# Patient Record
Sex: FEMALE | Race: WHITE | NOT HISPANIC OR LATINO
[De-identification: names, ages, dates, MRNs, and addresses within clinical notes are randomized per-mention and may not be internally consistent; named-entity substitution may affect disease eponyms.]

---

## 2017-01-05 ENCOUNTER — APPOINTMENT (OUTPATIENT)
Dept: PULMONOLOGY | Facility: CLINIC | Age: 57
End: 2017-01-05

## 2017-01-05 VITALS
OXYGEN SATURATION: 95 % | HEART RATE: 91 BPM | SYSTOLIC BLOOD PRESSURE: 130 MMHG | WEIGHT: 190 LBS | BODY MASS INDEX: 30.67 KG/M2 | RESPIRATION RATE: 16 BRPM | DIASTOLIC BLOOD PRESSURE: 94 MMHG

## 2017-01-05 DIAGNOSIS — Z78.9 OTHER SPECIFIED HEALTH STATUS: ICD-10-CM

## 2017-01-05 DIAGNOSIS — Z87.891 PERSONAL HISTORY OF NICOTINE DEPENDENCE: ICD-10-CM

## 2017-01-05 RX ORDER — LOSARTAN POTASSIUM 50 MG/1
50 TABLET, FILM COATED ORAL
Refills: 0 | Status: ACTIVE | COMMUNITY

## 2017-01-23 ENCOUNTER — RX RENEWAL (OUTPATIENT)
Age: 57
End: 2017-01-23

## 2017-03-08 ENCOUNTER — RX RENEWAL (OUTPATIENT)
Age: 57
End: 2017-03-08

## 2017-04-07 ENCOUNTER — APPOINTMENT (OUTPATIENT)
Dept: PULMONOLOGY | Facility: CLINIC | Age: 57
End: 2017-04-07

## 2017-04-28 ENCOUNTER — APPOINTMENT (OUTPATIENT)
Dept: PULMONOLOGY | Facility: CLINIC | Age: 57
End: 2017-04-28

## 2017-04-28 VITALS — HEIGHT: 66 IN | RESPIRATION RATE: 14 BRPM | HEART RATE: 97 BPM | OXYGEN SATURATION: 92 % | BODY MASS INDEX: 30.99 KG/M2

## 2017-04-28 VITALS — BODY MASS INDEX: 30.99 KG/M2 | WEIGHT: 192 LBS | DIASTOLIC BLOOD PRESSURE: 86 MMHG | SYSTOLIC BLOOD PRESSURE: 132 MMHG

## 2017-05-23 ENCOUNTER — MEDICATION RENEWAL (OUTPATIENT)
Age: 57
End: 2017-05-23

## 2017-08-16 ENCOUNTER — RX RENEWAL (OUTPATIENT)
Age: 57
End: 2017-08-16

## 2017-09-29 ENCOUNTER — APPOINTMENT (OUTPATIENT)
Dept: PULMONOLOGY | Facility: CLINIC | Age: 57
End: 2017-09-29
Payer: MEDICAID

## 2017-09-29 VITALS — BODY MASS INDEX: 30.51 KG/M2 | WEIGHT: 189 LBS | DIASTOLIC BLOOD PRESSURE: 84 MMHG | SYSTOLIC BLOOD PRESSURE: 130 MMHG

## 2017-09-29 VITALS — HEART RATE: 85 BPM | OXYGEN SATURATION: 95 %

## 2017-09-29 DIAGNOSIS — Z87.891 PERSONAL HISTORY OF NICOTINE DEPENDENCE: ICD-10-CM

## 2017-09-29 PROCEDURE — 99214 OFFICE O/P EST MOD 30 MIN: CPT

## 2017-10-05 ENCOUNTER — MEDICATION RENEWAL (OUTPATIENT)
Age: 57
End: 2017-10-05

## 2017-10-20 ENCOUNTER — RX RENEWAL (OUTPATIENT)
Age: 57
End: 2017-10-20

## 2017-11-03 ENCOUNTER — OTHER (OUTPATIENT)
Age: 57
End: 2017-11-03

## 2017-11-06 ENCOUNTER — RX RENEWAL (OUTPATIENT)
Age: 57
End: 2017-11-06

## 2017-11-29 ENCOUNTER — FORM ENCOUNTER (OUTPATIENT)
Age: 57
End: 2017-11-29

## 2017-11-30 ENCOUNTER — OUTPATIENT (OUTPATIENT)
Dept: OUTPATIENT SERVICES | Facility: HOSPITAL | Age: 57
LOS: 1 days | End: 2017-11-30
Payer: MEDICAID

## 2017-11-30 ENCOUNTER — APPOINTMENT (OUTPATIENT)
Dept: CT IMAGING | Facility: CLINIC | Age: 57
End: 2017-11-30
Payer: MEDICAID

## 2017-11-30 DIAGNOSIS — Z00.8 ENCOUNTER FOR OTHER GENERAL EXAMINATION: ICD-10-CM

## 2017-11-30 PROCEDURE — G0297: CPT

## 2017-11-30 PROCEDURE — G0297: CPT | Mod: 26

## 2017-12-18 ENCOUNTER — APPOINTMENT (OUTPATIENT)
Dept: PULMONOLOGY | Facility: CLINIC | Age: 57
End: 2017-12-18
Payer: MEDICAID

## 2017-12-18 VITALS — SYSTOLIC BLOOD PRESSURE: 124 MMHG | DIASTOLIC BLOOD PRESSURE: 84 MMHG | HEART RATE: 83 BPM | OXYGEN SATURATION: 93 %

## 2017-12-18 VITALS — BODY MASS INDEX: 31.64 KG/M2 | WEIGHT: 196 LBS

## 2017-12-18 PROCEDURE — 94010 BREATHING CAPACITY TEST: CPT

## 2017-12-18 PROCEDURE — 99215 OFFICE O/P EST HI 40 MIN: CPT | Mod: 25

## 2018-01-10 ENCOUNTER — RX RENEWAL (OUTPATIENT)
Age: 58
End: 2018-01-10

## 2018-03-19 ENCOUNTER — APPOINTMENT (OUTPATIENT)
Dept: PULMONOLOGY | Facility: CLINIC | Age: 58
End: 2018-03-19

## 2018-04-02 ENCOUNTER — MEDICATION RENEWAL (OUTPATIENT)
Age: 58
End: 2018-04-02

## 2018-04-10 ENCOUNTER — APPOINTMENT (OUTPATIENT)
Dept: PULMONOLOGY | Facility: CLINIC | Age: 58
End: 2018-04-10
Payer: MEDICAID

## 2018-04-10 VITALS
SYSTOLIC BLOOD PRESSURE: 112 MMHG | BODY MASS INDEX: 30.53 KG/M2 | WEIGHT: 190 LBS | RESPIRATION RATE: 14 BRPM | HEART RATE: 79 BPM | HEIGHT: 66 IN | DIASTOLIC BLOOD PRESSURE: 64 MMHG | OXYGEN SATURATION: 93 %

## 2018-04-10 PROCEDURE — 99214 OFFICE O/P EST MOD 30 MIN: CPT

## 2018-04-27 ENCOUNTER — RX RENEWAL (OUTPATIENT)
Age: 58
End: 2018-04-27

## 2018-05-07 ENCOUNTER — RX RENEWAL (OUTPATIENT)
Age: 58
End: 2018-05-07

## 2018-06-04 ENCOUNTER — MEDICATION RENEWAL (OUTPATIENT)
Age: 58
End: 2018-06-04

## 2018-07-31 ENCOUNTER — MEDICATION RENEWAL (OUTPATIENT)
Age: 58
End: 2018-07-31

## 2018-07-31 ENCOUNTER — RX RENEWAL (OUTPATIENT)
Age: 58
End: 2018-07-31

## 2018-08-29 ENCOUNTER — APPOINTMENT (OUTPATIENT)
Dept: PULMONOLOGY | Facility: CLINIC | Age: 58
End: 2018-08-29
Payer: MEDICAID

## 2018-08-29 VITALS
DIASTOLIC BLOOD PRESSURE: 78 MMHG | OXYGEN SATURATION: 91 % | HEART RATE: 74 BPM | HEIGHT: 66 IN | SYSTOLIC BLOOD PRESSURE: 130 MMHG | BODY MASS INDEX: 31.8 KG/M2

## 2018-08-29 VITALS — WEIGHT: 197 LBS | BODY MASS INDEX: 31.8 KG/M2

## 2018-08-29 DIAGNOSIS — Z87.891 PERSONAL HISTORY OF NICOTINE DEPENDENCE: ICD-10-CM

## 2018-08-29 PROCEDURE — 94060 EVALUATION OF WHEEZING: CPT

## 2018-08-29 PROCEDURE — 94729 DIFFUSING CAPACITY: CPT

## 2018-08-29 PROCEDURE — 94727 GAS DIL/WSHOT DETER LNG VOL: CPT

## 2018-08-29 PROCEDURE — 94664 DEMO&/EVAL PT USE INHALER: CPT | Mod: 59

## 2018-08-29 PROCEDURE — 99214 OFFICE O/P EST MOD 30 MIN: CPT | Mod: 25

## 2018-08-29 PROCEDURE — 85018 HEMOGLOBIN: CPT | Mod: QW

## 2018-10-02 ENCOUNTER — MEDICATION RENEWAL (OUTPATIENT)
Age: 58
End: 2018-10-02

## 2018-10-15 ENCOUNTER — RX RENEWAL (OUTPATIENT)
Age: 58
End: 2018-10-15

## 2018-10-29 ENCOUNTER — RX RENEWAL (OUTPATIENT)
Age: 58
End: 2018-10-29

## 2018-12-10 ENCOUNTER — APPOINTMENT (OUTPATIENT)
Dept: PULMONOLOGY | Facility: CLINIC | Age: 58
End: 2018-12-10
Payer: MEDICARE

## 2018-12-13 ENCOUNTER — FORM ENCOUNTER (OUTPATIENT)
Age: 58
End: 2018-12-13

## 2018-12-14 ENCOUNTER — OUTPATIENT (OUTPATIENT)
Dept: OUTPATIENT SERVICES | Facility: HOSPITAL | Age: 58
LOS: 1 days | End: 2018-12-14
Payer: MEDICARE

## 2018-12-14 ENCOUNTER — APPOINTMENT (OUTPATIENT)
Dept: CT IMAGING | Facility: CLINIC | Age: 58
End: 2018-12-14
Payer: MEDICARE

## 2018-12-14 DIAGNOSIS — Z87.891 PERSONAL HISTORY OF NICOTINE DEPENDENCE: ICD-10-CM

## 2018-12-14 PROCEDURE — G0297: CPT | Mod: 26

## 2018-12-14 PROCEDURE — G0297: CPT

## 2019-01-08 ENCOUNTER — APPOINTMENT (OUTPATIENT)
Dept: PULMONOLOGY | Facility: CLINIC | Age: 59
End: 2019-01-08
Payer: MEDICARE

## 2019-01-08 VITALS
OXYGEN SATURATION: 93 % | BODY MASS INDEX: 31.47 KG/M2 | SYSTOLIC BLOOD PRESSURE: 132 MMHG | HEART RATE: 90 BPM | RESPIRATION RATE: 16 BRPM | WEIGHT: 195 LBS | DIASTOLIC BLOOD PRESSURE: 80 MMHG

## 2019-01-08 PROCEDURE — 99214 OFFICE O/P EST MOD 30 MIN: CPT

## 2019-01-08 RX ORDER — PREDNISONE 10 MG/1
10 TABLET ORAL
Qty: 50 | Refills: 0 | Status: DISCONTINUED | COMMUNITY
Start: 2017-12-18 | End: 2019-01-08

## 2019-01-08 NOTE — REASON FOR VISIT
[Follow-Up] : a follow-up visit [COPD] : COPD [Shortness of Breath] : shortness of Breath [Sleep Apnea] : sleep apnea [FreeTextEntry2] : nicotine dependence, weight issues

## 2019-01-08 NOTE — REVIEW OF SYSTEMS
[Postnasal Drip] : postnasal drip [Reflux] : reflux [Depression] : depression [Thyroid Problem] : thyroid problem [As Noted in HPI] : as noted in HPI [Fever] : no fever [Chills] : no chills [Dry Eyes] : no dryness of the eyes [Eye Irritation] : no ~T irritation of the eyes [Nasal Congestion] : no nasal congestion [Epistaxis] : no nosebleeds [Sinus Problems] : no sinus problems [Cough] : no cough [Sputum] : not coughing up ~M sputum [Hemoptysis] : no hemoptysis [Dyspnea] : no dyspnea [Chest Tightness] : no chest tightness [Pleuritic Pain] : no pleuritic pain [Wheezing] : no wheezing [Hypertension] : no ~T hypertension [Chest Discomfort] : no chest discomfort [Dysrhythmia] : no dysrhythmia [Murmurs] : no murmurs were heard [Palpitations] : no palpitations [Edema] : ~T edema was not present [Hay Fever] : no hay fever [Itchy Eyes] : no itching of ~T the eyes [Nausea] : no nausea [Vomiting] : no vomiting [Constipation] : no constipation [Diarrhea] : no diarrhea [Abdominal Pain] : no abdominal pain [Dysuria] : no dysuria [Trauma] : no ~T physical trauma [Fracture] : no fracture [Anemia] : no anemia [Anxiety] : no anxiety [Diabetes] : no diabetes mellitus

## 2019-01-08 NOTE — PROCEDURE
[FreeTextEntry1] : PFTs performed previously revealed a mildly reduced FVC and moderately reduced FEV1 with an obstructive pattern. Lung volumes were elevated, c/w hyperinflation and gas trapping. Diffusion capacity was moderately reduced but improved though did not totally correct for alveolar volume. Overall, her spirometry was slightly improved but lung volumes were increased and diffusion capacity was near baseline.\par Spirometry 12/18/17 - moderate COPD and appears to be near baseline.\par PFTs 8/29/18 - mild reduction in FVC and moderate to severe reduction in FEV1 with an obstructive pattern and a significant BD response; Lung volumes are near normal; diffusion capacity is moderately reduced and improves when corrected for alveolar volume; slightly reduced lung function c/w her previous

## 2019-01-08 NOTE — PHYSICAL EXAM
[General Appearance - Well Developed] : well developed [Normal Appearance] : normal appearance [General Appearance - In No Acute Distress] : no acute distress [Normal Conjunctiva] : the conjunctiva exhibited no abnormalities [Low Lying Soft Palate] : low lying soft palate [Enlarged Base of the Tongue] : enlargement of the base of the tongue [III] : III [Neck Appearance] : the appearance of the neck was normal [Heart Rate And Rhythm] : heart rate and rhythm were normal [Heart Sounds] : normal S1 and S2 [Murmurs] : no murmurs present [Edema] : no peripheral edema present [] : no respiratory distress [Respiration, Rhythm And Depth] : normal respiratory rhythm and effort [Exaggerated Use Of Accessory Muscles For Inspiration] : no accessory muscle use [Scattered Wheezes] : scattered wheezing [Abdomen Soft] : soft [Abdomen Tenderness] : non-tender [Abnormal Walk] : normal gait [Nail Clubbing] : no clubbing of the fingernails [Cyanosis, Localized] : no localized cyanosis [No Focal Deficits] : no focal deficits [Oriented To Time, Place, And Person] : oriented to person, place, and time [FreeTextEntry1] : No abnormalities

## 2019-01-08 NOTE — HISTORY OF PRESENT ILLNESS
[Doing Well] : doing well [Difficulty Breathing During Exertion] : stable dyspnea on exertion [Feelings Of Weakness On Exertion] : stable exercise intolerance [Cough] : denies coughing [Coughing Up Sputum] : denies coughing up sputum [Wheezing] : denies wheezing [Regional Soft Tissue Swelling Both Lower Extremities] : denies lower extremity edema [Adherent] : the patient is adherent with ~his/her~ medication regimen [Goals--Doing Well] : the patient is doing well with ~his/her~ COPD goals [Excessive Daytime Sleepiness] : excessive daytime sleepiness [Snoring] : snoring [Unrefreshing Sleep] : unrefreshing sleep [Sleepy When Sedentary] : sleepy when sedentary [Morning Headaches] : morning headaches [None] : The patient is not currently being treated for this problem [Follow-Up - Routine Clinic] : clinic follow-up for [High Interest] : high interest in quitting [Several Times] : tried to quit several times [Weight Control] : weight control [Stress Management] : stress management [Saving Money] : saving money [Improved Health] : improved health [Smoking Addiction] : smoking addiction [Nicotine Craving] : nicotine craving [Currently Experiencing] : The patient is currently experiencing symptoms. [FreeTextEntry1] : The patient reports that she is recovering from a URI/bronchitis but continues to c/o mild cough and SOBOE. She reports completing a course of Augmentin for her bronchitis. [Witnessed Apnea During Sleep] : no witnessed apnea during sleep [Witnessed Gasping During Sleep] : no witnessed gasping during sleep

## 2019-01-08 NOTE — DISCUSSION/SUMMARY
[FreeTextEntry1] : \par #1. She no longer has her BiPAP at 19/14 cm of water to treat severe YONY as she was noncompliant with it but was using an oral appliance; will repeat PSG\par #2. Continue Spiriva and Symbicort for COPD with Albuterol nebulizer and Ventolin as needed; ordered nebulizer for Albuterol nebs as needed for COPD previously\par #3. Continue smoking cessation - stressed importance; pt quit 7/2018\par #4. F/u after PSG\par #5. Diet and exercise for weight loss\par #6. Ambien for poor sleep as needed\par #7. Chest CT for lung cancer screening revealed resolution of very small nodules measuring up to 2 mm but otherwise stable changes- rec annual f/u (1/2020).

## 2019-01-08 NOTE — CONSULT LETTER
[Dear  ___] : Dear  [unfilled], [Consult Letter:] : I had the pleasure of evaluating your patient, [unfilled]. [Please see my note below.] : Please see my note below. [Consult Closing:] : Thank you very much for allowing me to participate in the care of this patient.  If you have any questions, please do not hesitate to contact me. [Sincerely,] : Sincerely, [Jostin Mendieta MD] : Jostin Mendieta MD [FreeTextEntry3] : Jostin Mendieta MD, FCCP, ROXANNE. ABSM\par

## 2019-02-22 ENCOUNTER — APPOINTMENT (OUTPATIENT)
Dept: PULMONOLOGY | Facility: CLINIC | Age: 59
End: 2019-02-22

## 2019-04-08 ENCOUNTER — RX RENEWAL (OUTPATIENT)
Age: 59
End: 2019-04-08

## 2019-04-16 ENCOUNTER — RX RENEWAL (OUTPATIENT)
Age: 59
End: 2019-04-16

## 2019-05-09 ENCOUNTER — RX RENEWAL (OUTPATIENT)
Age: 59
End: 2019-05-09

## 2019-06-14 ENCOUNTER — APPOINTMENT (OUTPATIENT)
Dept: PULMONOLOGY | Facility: CLINIC | Age: 59
End: 2019-06-14
Payer: MEDICARE

## 2019-06-14 VITALS — WEIGHT: 203 LBS | DIASTOLIC BLOOD PRESSURE: 92 MMHG | SYSTOLIC BLOOD PRESSURE: 146 MMHG | BODY MASS INDEX: 32.77 KG/M2

## 2019-06-14 VITALS — OXYGEN SATURATION: 94 % | HEART RATE: 86 BPM

## 2019-06-14 PROCEDURE — 94010 BREATHING CAPACITY TEST: CPT

## 2019-06-14 PROCEDURE — G0296 VISIT TO DETERM LDCT ELIG: CPT

## 2019-06-14 PROCEDURE — 99215 OFFICE O/P EST HI 40 MIN: CPT | Mod: 25

## 2019-06-14 RX ORDER — ALBUTEROL SULFATE 90 UG/1
108 (90 BASE) AEROSOL, METERED RESPIRATORY (INHALATION)
Qty: 1 | Refills: 5 | Status: DISCONTINUED | COMMUNITY
Start: 2019-03-07 | End: 2019-06-14

## 2019-06-14 NOTE — REVIEW OF SYSTEMS
[Postnasal Drip] : postnasal drip [Reflux] : reflux [Depression] : depression [Thyroid Problem] : thyroid problem [As Noted in HPI] : as noted in HPI [Fever] : no fever [Chills] : no chills [Dry Eyes] : no dryness of the eyes [Eye Irritation] : no ~T irritation of the eyes [Epistaxis] : no nosebleeds [Nasal Congestion] : no nasal congestion [Sputum] : not coughing up ~M sputum [Cough] : no cough [Sinus Problems] : no sinus problems [Hemoptysis] : no hemoptysis [Dyspnea] : no dyspnea [Pleuritic Pain] : no pleuritic pain [Chest Tightness] : no chest tightness [Wheezing] : no wheezing [Hypertension] : no ~T hypertension [Chest Discomfort] : no chest discomfort [Murmurs] : no murmurs were heard [Dysrhythmia] : no dysrhythmia [Edema] : ~T edema was not present [Palpitations] : no palpitations [Hay Fever] : no hay fever [Itchy Eyes] : no itching of ~T the eyes [Nausea] : no nausea [Constipation] : no constipation [Vomiting] : no vomiting [Diarrhea] : no diarrhea [Dysuria] : no dysuria [Abdominal Pain] : no abdominal pain [Trauma] : no ~T physical trauma [Anemia] : no anemia [Fracture] : no fracture [Anxiety] : no anxiety [Diabetes] : no diabetes mellitus

## 2019-06-14 NOTE — DISCUSSION/SUMMARY
[FreeTextEntry1] : \par #1. She no longer has her BiPAP at 19/14 cm of water to treat severe YONY as she was noncompliant with it but was using an oral appliance which she also stopped; will repeat PSG but she wants a HST\par #2. Continue Spiriva and Symbicort for COPD with Albuterol nebulizer and Ventolin as needed; ordered nebulizer for Albuterol nebs as needed for COPD previously\par #3. Continue smoking cessation - stressed importance; pt quit 7/2018 after 1 ppd x 40 yrs\par #4. F/u after home PSG\par #5. Diet and exercise for weight loss\par #6. Ambien for poor sleep as needed\par #7. Chest CT for lung cancer screening given smoking hx. Risks and benefits regarding screening CT discussed including but not limited to the benefit of early lung cancer detection as well as other possible unknown pathology but also risk of discovering nodules or other findings which may be benign but will require periodic evaluation. Her last chest CT for lung cancer screening revealed resolution of very small nodules measuring up to 2 mm but otherwise stable changes- rec annual f/u (12/2019).

## 2019-06-14 NOTE — HISTORY OF PRESENT ILLNESS
[Doing Well] : doing well [Feelings Of Weakness On Exertion] : stable exercise intolerance [Difficulty Breathing During Exertion] : stable dyspnea on exertion [Cough] : denies coughing [Coughing Up Sputum] : denies coughing up sputum [Wheezing] : denies wheezing [Regional Soft Tissue Swelling Both Lower Extremities] : denies lower extremity edema [Adherent] : the patient is adherent with ~his/her~ medication regimen [Goals--Doing Well] : the patient is doing well with ~his/her~ COPD goals [Excessive Daytime Sleepiness] : excessive daytime sleepiness [Snoring] : snoring [Unrefreshing Sleep] : unrefreshing sleep [Sleepy When Sedentary] : sleepy when sedentary [Morning Headaches] : morning headaches [None] : The patient is not currently being treated for this problem [Follow-Up - Routine Clinic] : clinic follow-up for [High Interest] : high interest in quitting [Several Times] : tried to quit several times [Weight Control] : weight control [Stress Management] : stress management [Saving Money] : saving money [Improved Health] : improved health [Nicotine Craving] : nicotine craving [Smoking Addiction] : smoking addiction [Currently Experiencing] : The patient is currently experiencing symptoms. [FreeTextEntry1] : The patient reports that she is recovering from a URI/bronchitis but continues to c/o mild cough and SOBOE. She reports completing a course of Augmentin for her bronchitis. [Witnessed Apnea During Sleep] : no witnessed apnea during sleep [Witnessed Gasping During Sleep] : no witnessed gasping during sleep

## 2019-06-14 NOTE — PHYSICAL EXAM
[General Appearance - Well Developed] : well developed [Normal Appearance] : normal appearance [General Appearance - In No Acute Distress] : no acute distress [Normal Conjunctiva] : the conjunctiva exhibited no abnormalities [Low Lying Soft Palate] : low lying soft palate [III] : III [Enlarged Base of the Tongue] : enlargement of the base of the tongue [Neck Appearance] : the appearance of the neck was normal [Heart Rate And Rhythm] : heart rate and rhythm were normal [Heart Sounds] : normal S1 and S2 [Murmurs] : no murmurs present [Edema] : no peripheral edema present [Respiration, Rhythm And Depth] : normal respiratory rhythm and effort [] : no respiratory distress [Exaggerated Use Of Accessory Muscles For Inspiration] : no accessory muscle use [Scattered Wheezes] : scattered wheezing [Abdomen Soft] : soft [Abdomen Tenderness] : non-tender [Abnormal Walk] : normal gait [Nail Clubbing] : no clubbing of the fingernails [Cyanosis, Localized] : no localized cyanosis [No Focal Deficits] : no focal deficits [Oriented To Time, Place, And Person] : oriented to person, place, and time [FreeTextEntry1] : Moderate oropharyngeal crowding.

## 2019-06-14 NOTE — PROCEDURE
[FreeTextEntry1] : PFTs performed previously revealed a mildly reduced FVC and moderately reduced FEV1 with an obstructive pattern. Lung volumes were elevated, c/w hyperinflation and gas trapping. Diffusion capacity was moderately reduced but improved though did not totally correct for alveolar volume. Overall, her spirometry was slightly improved but lung volumes were increased and diffusion capacity was near baseline.\par Spirometry 12/18/17 - moderate COPD and appears to be near baseline.\par PFTs 8/29/18 - mild reduction in FVC and moderate to severe reduction in FEV1 with an obstructive pattern and a significant BD response; Lung volumes are near normal; diffusion capacity is moderately reduced and improves when corrected for alveolar volume; slightly reduced lung function c/w her previous\par Spirometry 6/14/19 - Normal FVC and moderately reduced FEV1 with an obstructive pattern and improved from previous

## 2019-06-14 NOTE — CONSULT LETTER
[Consult Letter:] : I had the pleasure of evaluating your patient, [unfilled]. [Dear  ___] : Dear  [unfilled], [Please see my note below.] : Please see my note below. [Consult Closing:] : Thank you very much for allowing me to participate in the care of this patient.  If you have any questions, please do not hesitate to contact me. [Sincerely,] : Sincerely, [Jostin Mendieta MD] : Jostin Mendieta MD [FreeTextEntry3] : Jostin Mendieta MD, FCCP, ROXANNE. ABSM\par

## 2019-11-12 ENCOUNTER — RX RENEWAL (OUTPATIENT)
Age: 59
End: 2019-11-12

## 2019-11-12 RX ORDER — ALBUTEROL SULFATE 90 UG/1
108 (90 BASE) AEROSOL, METERED RESPIRATORY (INHALATION)
Qty: 1 | Refills: 5 | Status: DISCONTINUED | COMMUNITY
Start: 2019-06-14 | End: 2019-11-12

## 2019-12-29 ENCOUNTER — FORM ENCOUNTER (OUTPATIENT)
Age: 59
End: 2019-12-29

## 2019-12-30 ENCOUNTER — APPOINTMENT (OUTPATIENT)
Dept: CT IMAGING | Facility: CLINIC | Age: 59
End: 2019-12-30
Payer: COMMERCIAL

## 2019-12-30 ENCOUNTER — OUTPATIENT (OUTPATIENT)
Dept: OUTPATIENT SERVICES | Facility: HOSPITAL | Age: 59
LOS: 1 days | End: 2019-12-30
Payer: MEDICARE

## 2019-12-30 VITALS — HEIGHT: 66 IN | WEIGHT: 195 LBS | BODY MASS INDEX: 31.34 KG/M2

## 2019-12-30 DIAGNOSIS — E66.3 OVERWEIGHT: ICD-10-CM

## 2019-12-30 PROCEDURE — G0297: CPT

## 2019-12-30 PROCEDURE — G0297: CPT | Mod: 26

## 2019-12-30 NOTE — DATA REVIEWED
[Lung Cancer Screening] : Patient underwent lung cancer screening [S] : S [2] : 2 [1] : 1 [TextBox_12] : 10/15 [TextBox_27] : 11/16 [TextBox_42] : 12/18 [TextBox_52] : 4

## 2019-12-30 NOTE — HISTORY OF PRESENT ILLNESS
[TextBox_13] : Referred by Dr. Jostin Mendieta.\par \par Ms. HERNANDEZ is a 59 year old female with a history of HTN, COPD and emphysema.\par \par She was called today to review eligibility for Low-Dose CT lung cancer screening.  Reviewed and confirmed that the patient meets screening eligibility criteria:\par \par 59 years old \par \par Smoking Status: Current Smoker\par \par Number of pack(s) per day: 1\par Number of years smoked: 40\par Number of pack years smokin\par \par Ms. HERNANDEZ denies any symptoms of lung cancer, including new cough, change in cough, hemoptysis, and unintentional weight loss.\par \par Ms. HERNANDEZ denies any personal history of lung cancer.  No lung cancer in a first degree relative.  Denies any history of occupational exposures.

## 2019-12-30 NOTE — PLAN
[FreeTextEntry1] : - Low Dose CT chest for lung cancer screening.\par \par - Encouraged smoking cessation\par \par \par

## 2019-12-30 NOTE — REASON FOR VISIT
[Annual Follow-Up] : an annual follow-up visit [Virtual Visit] : virtual visit [Low-Dose CT Screening Discussion] : low-dose CT lung cancer screening discussion

## 2020-02-10 ENCOUNTER — RX RENEWAL (OUTPATIENT)
Age: 60
End: 2020-02-10

## 2020-03-03 ENCOUNTER — RX CHANGE (OUTPATIENT)
Age: 60
End: 2020-03-03

## 2020-03-04 ENCOUNTER — APPOINTMENT (OUTPATIENT)
Dept: PULMONOLOGY | Facility: CLINIC | Age: 60
End: 2020-03-04
Payer: COMMERCIAL

## 2020-03-04 VITALS
DIASTOLIC BLOOD PRESSURE: 80 MMHG | OXYGEN SATURATION: 94 % | HEIGHT: 66 IN | SYSTOLIC BLOOD PRESSURE: 140 MMHG | HEART RATE: 76 BPM | BODY MASS INDEX: 31.82 KG/M2 | WEIGHT: 198 LBS

## 2020-03-04 PROCEDURE — 99214 OFFICE O/P EST MOD 30 MIN: CPT

## 2020-03-04 PROCEDURE — G0296 VISIT TO DETERM LDCT ELIG: CPT

## 2020-03-04 NOTE — COUNSELING
[Potential consequences of obesity discussed] : Potential consequences of obesity discussed [Benefits of weight loss discussed] : Benefits of weight loss discussed [Encouraged to increase physical activity] : Encouraged to increase physical activity [ - Annual Lung Cancer Screening/Share Decision Making Discussion] : Annual Lung Cancer Screening/Share Decision Making Discussion. (I have advised this patient to have a Low Dose CT (LDCT) scan of the lungs and have discussed the following with the patient in a shared decision making discussion:   Benefits of Detection and Early Treatment: There is adequate evidence that annual screening for lung cancer with LDCT in a population of high-risk persons can prevent a substantial number of lung cancer–related deaths. The magnitude of benefit depends on the individual patient's risk for lung cancer, as those who are at highest risk are most likely to benefit. Screening cannot prevent most lung cancer–related deaths, and does not replace smoking cessation. Harms of Detection and Early Intervention and Treatment: The harms associated with LDCT screening include false-negative and false-positive results, incidental findings, over diagnosis, and radiation exposure. False-positive LDCT results occur in a substantial proportion of screened persons; 95% of all positive results do not lead to a diagnosis of cancer. In a high-quality screening program, further imaging can resolve most false-positive results; however, some patients may require invasive procedures. Radiation harms, including cancer resulting from cumulative exposure to radiation, vary depending on the age at the start of screening; the number of scans received; and the person's exposure to other sources of radiation, particularly other medical imaging.)

## 2020-03-04 NOTE — CONSULT LETTER
[Dear  ___] : Dear  [unfilled], [Please see my note below.] : Please see my note below. [Consult Letter:] : I had the pleasure of evaluating your patient, [unfilled]. [Consult Closing:] : Thank you very much for allowing me to participate in the care of this patient.  If you have any questions, please do not hesitate to contact me. [Sincerely,] : Sincerely, [Jostin Mendieta MD] : Jostin Mendieta MD [FreeTextEntry3] : Jostin Mendieta MD, FCCP, D. ABSM\par Pulmonary and Sleep Medicine\par [facility] [place]

## 2020-03-04 NOTE — HISTORY OF PRESENT ILLNESS
[Doing Well] : doing well [Difficulty Breathing During Exertion] : stable dyspnea on exertion [Feelings Of Weakness On Exertion] : stable exercise intolerance [Cough] : denies coughing [Coughing Up Sputum] : denies coughing up sputum [Wheezing] : denies wheezing [Regional Soft Tissue Swelling Both Lower Extremities] : denies lower extremity edema [Adherent] : the patient is adherent with ~his/her~ medication regimen [Goals--Doing Well] : the patient is doing well with ~his/her~ COPD goals [Excessive Daytime Sleepiness] : excessive daytime sleepiness [Snoring] : snoring [Unrefreshing Sleep] : unrefreshing sleep [Sleepy When Sedentary] : sleepy when sedentary [Morning Headaches] : morning headaches [None] : The patient is not currently being treated for this problem [Follow-Up - Routine Clinic] : clinic follow-up for [High Interest] : high interest in quitting [Several Times] : tried to quit several times [Weight Control] : weight control [Stress Management] : stress management [Saving Money] : saving money [Improved Health] : improved health [Smoking Addiction] : smoking addiction [Nicotine Craving] : nicotine craving [Currently Experiencing] : The patient is currently experiencing symptoms. [FreeTextEntry1] : The patient reports that she is recovering from a URI/bronchitis but continues to c/o mild cough and SOBOE. She reports completing a course of Augmentin for her bronchitis.\par She is an ex-smoker of 1 ppd x 40 years but quit 7/2018. [Witnessed Apnea During Sleep] : no witnessed apnea during sleep [Witnessed Gasping During Sleep] : no witnessed gasping during sleep

## 2020-03-04 NOTE — DISCUSSION/SUMMARY
[FreeTextEntry1] : \par #1. She no longer has her BiPAP at 19/14 cm of water to treat severe YONY as she was noncompliant with it but was using an oral appliance which she also stopped; will repeat PSG but she wants a HST\par #2. Continue Spiriva and Symbicort for COPD with Albuterol nebulizer and Ventolin as needed; ordered nebulizer for Albuterol nebs as needed for COPD previously\par #3. Continue smoking cessation - stressed importance; pt quit 7/2018 after 1 ppd x 40 yrs; minimize E-cigarette use\par #4. F/u after home PSG\par #5. Diet and exercise for weight loss\par #6. Ambien for poor sleep as needed\par #7. Chest CT for lung cancer screening given smoking hx. Risks and benefits regarding screening CT discussed including but not limited to the benefit of early lung cancer detection as well as other possible unknown pathology but also risk of discovering nodules or other findings which may be benign but will require periodic evaluation. Her last chest CT for lung cancer screening revealed resolution of very small nodules measuring up to 2 mm but otherwise stable changes- rec annual f/u (12/2020-1/2021). Current CT is stable

## 2020-03-04 NOTE — REVIEW OF SYSTEMS
[Postnasal Drip] : postnasal drip [Reflux] : reflux [Depression] : depression [As Noted in HPI] : as noted in HPI [Thyroid Problem] : thyroid problem [Fever] : no fever [Chills] : no chills [Dry Eyes] : no dryness of the eyes [Eye Irritation] : no ~T irritation of the eyes [Nasal Congestion] : no nasal congestion [Epistaxis] : no nosebleeds [Sinus Problems] : no sinus problems [Cough] : no cough [Sputum] : not coughing up ~M sputum [Hemoptysis] : no hemoptysis [Dyspnea] : no dyspnea [Chest Tightness] : no chest tightness [Pleuritic Pain] : no pleuritic pain [Wheezing] : no wheezing [Hypertension] : no ~T hypertension [Chest Discomfort] : no chest discomfort [Dysrhythmia] : no dysrhythmia [Murmurs] : no murmurs were heard [Palpitations] : no palpitations [Edema] : ~T edema was not present [Hay Fever] : no hay fever [Itchy Eyes] : no itching of ~T the eyes [Nausea] : no nausea [Vomiting] : no vomiting [Constipation] : no constipation [Diarrhea] : no diarrhea [Abdominal Pain] : no abdominal pain [Dysuria] : no dysuria [Trauma] : no ~T physical trauma [Fracture] : no fracture [Anemia] : no anemia [Anxiety] : no anxiety [Diabetes] : no diabetes mellitus

## 2020-06-02 ENCOUNTER — APPOINTMENT (OUTPATIENT)
Dept: NEUROSURGERY | Facility: CLINIC | Age: 60
End: 2020-06-02
Payer: COMMERCIAL

## 2020-06-02 VITALS
WEIGHT: 195 LBS | HEIGHT: 66 IN | SYSTOLIC BLOOD PRESSURE: 177 MMHG | HEART RATE: 102 BPM | DIASTOLIC BLOOD PRESSURE: 92 MMHG | BODY MASS INDEX: 31.34 KG/M2

## 2020-06-02 PROCEDURE — 99204 OFFICE O/P NEW MOD 45 MIN: CPT

## 2020-06-02 NOTE — PLAN
[FreeTextEntry1] : \par DIAGNOSIS:  DISK DEGENERATION  /STENOSIS \par TREATMENT PLAN:  NON-SURGICAL\par \par This is a patient with a degenerated lumbar disk.  I have recommended nonsurgical management at this time.  This consists of physical therapy and/or manual medicine in conjunction to medical therapy and other conservative methods.  These include the consideration of trigger point injections and or the utilization of modalities such as TENS where applicable.  The next tier would be the referral to a pain management specialist (anesthesia or physiatry) for the consideration of spinal injections.  This includes the options of epidural steroids, facet injections as well as other novel techniques that may provide pain relief.  Although there is indeed evidence of disk degeneration on imaging, discectomy or spinal fusion for the sole purposes of treating back/leg pain in the absence of a compressive lesion or neurological issue is associated with poor outcomes.   \par \par I have reviewed the images in detail with the patient today in my office and have answered all questions regarding this condition to the best of my ability to the patient’s satisfaction.

## 2020-06-02 NOTE — PHYSICAL EXAM
[Antalgic] : antalgic [Intact] : all sensory within normal limits bilaterally [Able to toe walk] : the patient was not able to toe walk [Able to heel walk] : the patient was not able to heel walk

## 2020-06-02 NOTE — RESULTS/DATA
[FreeTextEntry1] : \par [facility] MRI shows disc degeneration mild-to-moderate stenosis L3-4-4 5 and mild degeneration with disc bulge at L5-S1.

## 2020-06-02 NOTE — REASON FOR VISIT
[New Patient Visit] : a new patient visit [FreeTextEntry1] : Lower back pain radiating into L-leg. anger imaging

## 2020-06-02 NOTE — HISTORY OF PRESENT ILLNESS
[de-identified] : \jasmin Manzo is a pleasant 59-year-old here for evaluation of her lumbar spine.  She has a history of significant COPD and is still currently smoking. She is a lumbar spine MRI for my review it Dain. She has a history of chronic back pain and radiculopathy left greater than right. She seen neurology doctors in the past and trigger point without much help. She's done some nonsurgical methods without relief. She has not done any pain management formally.  She has symptoms of left-sided radiculopathy L5-S1 with mild symptoms of neurogenic claudication.  \par

## 2020-06-02 NOTE — REVIEW OF SYSTEMS
[Leg Weakness] : leg weakness [Tingling] : tingling [Numbness] : numbness [Difficulty Walking] : difficulty walking

## 2020-08-26 ENCOUNTER — APPOINTMENT (OUTPATIENT)
Dept: PULMONOLOGY | Facility: CLINIC | Age: 60
End: 2020-08-26

## 2020-10-30 ENCOUNTER — RX RENEWAL (OUTPATIENT)
Age: 60
End: 2020-10-30

## 2021-01-11 ENCOUNTER — RX CHANGE (OUTPATIENT)
Age: 61
End: 2021-01-11

## 2021-01-12 ENCOUNTER — RX CHANGE (OUTPATIENT)
Age: 61
End: 2021-01-12

## 2021-01-12 RX ORDER — BUDESONIDE AND FORMOTEROL FUMARATE DIHYDRATE 160; 4.5 UG/1; UG/1
160-4.5 AEROSOL RESPIRATORY (INHALATION)
Qty: 1 | Refills: 0 | Status: DISCONTINUED | COMMUNITY
Start: 2017-01-05 | End: 2021-01-12

## 2021-01-26 ENCOUNTER — RX RENEWAL (OUTPATIENT)
Age: 61
End: 2021-01-26

## 2021-02-11 ENCOUNTER — NON-APPOINTMENT (OUTPATIENT)
Age: 61
End: 2021-02-11

## 2021-02-11 VITALS — WEIGHT: 200 LBS | HEIGHT: 66 IN | BODY MASS INDEX: 32.14 KG/M2

## 2021-02-11 NOTE — DATA REVIEWED
[Lung Cancer Screening] : Patient underwent lung cancer screening [S] : S [1] : 1 [2] : 2 [TextBox_12] : 10/15 [TextBox_27] : 11/16 [TextBox_42] : 12/19 [TextBox_52] : 5

## 2021-02-11 NOTE — HISTORY OF PRESENT ILLNESS
[TextBox_13] : Referred by Dr. Jostin Mendieta.\par \par Ms. HERNANDEZ is a 60 year old female with a history of HTN, COPD and emphysema.\par \par She was called to review eligibility for Low-Dose CT lung cancer screening.  Reviewed and confirmed that the patient meets screening eligibility criteria:\par \par 60 years old \par \par Smoking Status: Current Smoker\par \par Number of pack(s) per day: 1\par Number of years smoked: 41\par Number of pack years smokin\par \par Ms. HERNANDEZ denies any symptoms of lung cancer, including new cough, change in cough, hemoptysis, and unintentional weight loss.\par \par Ms. HERNANDEZ denies any personal history of lung cancer.  No lung cancer in a first degree relative.  Denies any history of occupational exposures.

## 2021-02-19 ENCOUNTER — RX RENEWAL (OUTPATIENT)
Age: 61
End: 2021-02-19

## 2021-03-09 ENCOUNTER — RX RENEWAL (OUTPATIENT)
Age: 61
End: 2021-03-09

## 2021-04-02 ENCOUNTER — APPOINTMENT (OUTPATIENT)
Dept: PULMONOLOGY | Facility: CLINIC | Age: 61
End: 2021-04-02
Payer: COMMERCIAL

## 2021-04-02 VITALS
OXYGEN SATURATION: 95 % | WEIGHT: 195 LBS | HEIGHT: 66 IN | RESPIRATION RATE: 15 BRPM | HEART RATE: 105 BPM | BODY MASS INDEX: 31.34 KG/M2 | DIASTOLIC BLOOD PRESSURE: 80 MMHG | SYSTOLIC BLOOD PRESSURE: 116 MMHG

## 2021-04-02 DIAGNOSIS — F17.200 NICOTINE DEPENDENCE, UNSPECIFIED, UNCOMPLICATED: ICD-10-CM

## 2021-04-02 DIAGNOSIS — F17.210 NICOTINE DEPENDENCE, CIGARETTES, UNCOMPLICATED: ICD-10-CM

## 2021-04-02 PROCEDURE — G0296 VISIT TO DETERM LDCT ELIG: CPT

## 2021-04-02 PROCEDURE — 99072 ADDL SUPL MATRL&STAF TM PHE: CPT

## 2021-04-02 PROCEDURE — 99406 BEHAV CHNG SMOKING 3-10 MIN: CPT

## 2021-04-02 PROCEDURE — 99215 OFFICE O/P EST HI 40 MIN: CPT | Mod: 25

## 2021-04-02 RX ORDER — UMECLIDINIUM 62.5 UG/1
62.5 AEROSOL, POWDER ORAL DAILY
Qty: 1 | Refills: 5 | Status: ACTIVE | COMMUNITY
Start: 2021-04-02 | End: 1900-01-01

## 2021-04-02 NOTE — DISCUSSION/SUMMARY
[FreeTextEntry1] : \par #1. She no longer has her BiPAP at 19/14 cm of water to treat severe YONY as she was noncompliant with it but was using an oral appliance which she also stopped; will repeat PSG \par #2. Continue Spiriva/Incruse and Symbicort for COPD with Albuterol nebulizer and Ventolin as needed; ordered nebulizer for Albuterol nebs as needed for COPD previously; pt with asthmatic component given BD response\par #3. Continue smoking cessation - stressed importance; pt quit 7/2018 after 1 ppd x 43 yrs; minimize E-cigarette use\par #4. Home PSG was attempted but pt had difficulty with it so will arrange for in-lab study\par #5. Diet and exercise for weight loss\par #6. Ambien for poor sleep as needed\par #7. Chest CT for lung cancer screening given smoking hx. Risks and benefits regarding screening CT discussed including but not limited to the benefit of early lung cancer detection as well as other possible unknown pathology but also risk of discovering nodules or other findings which may be benign but will require periodic evaluation. Her last chest CT for lung cancer screening revealed resolution of very small nodules measuring up to 2 mm but otherwise stable changes- rec annual f/u. Current CT is stable\par #7. F/u in 1 month with PFTs and chest CT\par #8. Pt considering Covid vaccine\par #9. Reviewed risks of exposure and symptoms of Covid-19 virus, including how the virus is spread and precautions to avoid li virus.\par \par Patient's questions were answered and patient appears to understand these recommendations

## 2021-04-02 NOTE — HISTORY OF PRESENT ILLNESS
[Doing Well] : doing well [Difficulty Breathing During Exertion] : stable dyspnea on exertion [Feelings Of Weakness On Exertion] : stable exercise intolerance [Cough] : denies coughing [Coughing Up Sputum] : denies coughing up sputum [Wheezing] : denies wheezing [Regional Soft Tissue Swelling Both Lower Extremities] : denies lower extremity edema [Adherent] : the patient is adherent with ~his/her~ medication regimen [Goals--Doing Well] : the patient is doing well with ~his/her~ COPD goals [Excessive Daytime Sleepiness] : excessive daytime sleepiness [Snoring] : snoring [Unrefreshing Sleep] : unrefreshing sleep [Sleepy When Sedentary] : sleepy when sedentary [Morning Headaches] : morning headaches [High Interest] : high interest in quitting [Several Times] : tried to quit several times [Weight Control] : weight control [Stress Management] : stress management [Saving Money] : saving money [Improved Health] : improved health [Smoking Addiction] : smoking addiction [Nicotine Craving] : nicotine craving [Follow-Up - Routine Clinic] : a routine clinic follow-up of [Excess Weight] : excess weight [Currently Experiencing] : The patient is currently experiencing symptoms. [Dyspnea] : dyspnea [FreeTextEntry1] : The patient reports that she is recovering from a URI/bronchitis but continues to c/o mild cough and SOBOE. She reports completing a course of Augmentin for her bronchitis.\par She is an ex-smoker of 1 ppd x 43 (since age 17) years but is now down to 1/2 ppd on Wellbutrin.\par She was last seen > 1 year ago due to Covid concerns. [Witnessed Apnea During Sleep] : no witnessed apnea during sleep [Witnessed Gasping During Sleep] : no witnessed gasping during sleep [On ___] : performed on [unfilled] [Patient] : the patient [Indication ___] : for an indication of [unfilled] [None] : no new symptoms reported [FreeTextEntry9] : Chest CT [FreeTextEntry8] : Multiple very small nodules measuring up to 2 mm in size

## 2021-04-02 NOTE — CONSULT LETTER
[Dear  ___] : Dear  [unfilled], [Consult Letter:] : I had the pleasure of evaluating your patient, [unfilled]. [Please see my note below.] : Please see my note below. [Consult Closing:] : Thank you very much for allowing me to participate in the care of this patient.  If you have any questions, please do not hesitate to contact me. [Sincerely,] : Sincerely, [FreeTextEntry3] : Jostin Mendieta MD, FCCP, D. ABSM\par Pulmonary and Sleep Medicine\par [facility] at [place]

## 2021-04-02 NOTE — COUNSELING
[Potential consequences of obesity discussed] : Potential consequences of obesity discussed [Benefits of weight loss discussed] : Benefits of weight loss discussed [Encouraged to increase physical activity] : Encouraged to increase physical activity [Risk of tobacco use and health benefits of smoking cessation discussed] : Risk of tobacco use and health benefits of smoking cessation discussed [Cessation strategies including cessation program discussed] : Cessation strategies including cessation program discussed [Use of nicotine replacement therapies and other medications discussed] : Use of nicotine replacement therapies and other medications discussed [Encouraged to pick a quit date and identify support needed to quit] : Encouraged to pick a quit date and identify support needed to quit [Tobacco Use Cessation Intermediate Greater Than 3 Minutes Up to 10 Minutes] : Tobacco Use Cessation Intermediate Greater Than 3 Minutes Up to 10 Minutes [ - Annual Lung Cancer Screening/Share Decision Making Discussion] : Annual Lung Cancer Screening/Share Decision Making Discussion. (I have advised this patient to have a Low Dose CT (LDCT) scan of the lungs and have discussed the following with the patient in a shared decision making discussion:   Benefits of Detection and Early Treatment: There is adequate evidence that annual screening for lung cancer with LDCT in a population of high-risk persons can prevent a substantial number of lung cancer–related deaths. The magnitude of benefit depends on the individual patient's risk for lung cancer, as those who are at highest risk are most likely to benefit. Screening cannot prevent most lung cancer–related deaths, and does not replace smoking cessation. Harms of Detection and Early Intervention and Treatment: The harms associated with LDCT screening include false-negative and false-positive results, incidental findings, over diagnosis, and radiation exposure. False-positive LDCT results occur in a substantial proportion of screened persons; 95% of all positive results do not lead to a diagnosis of cancer. In a high-quality screening program, further imaging can resolve most false-positive results; however, some patients may require invasive procedures. Radiation harms, including cancer resulting from cumulative exposure to radiation, vary depending on the age at the start of screening; the number of scans received; and the person's exposure to other sources of radiation, particularly other medical imaging.) [FreeTextEntry1] : 4

## 2021-04-02 NOTE — REASON FOR VISIT
[Follow-Up] : a follow-up visit [Sleep Apnea] : sleep apnea [COPD] : COPD [Shortness of Breath] : shortness of breath [Obesity] : obesity [Nicotine Dependence] : nicotine dependence [TextBox_44] : Insomnia

## 2021-04-05 ENCOUNTER — RX CHANGE (OUTPATIENT)
Age: 61
End: 2021-04-05

## 2021-04-09 ENCOUNTER — OUTPATIENT (OUTPATIENT)
Dept: OUTPATIENT SERVICES | Facility: HOSPITAL | Age: 61
LOS: 1 days | End: 2021-04-09
Payer: COMMERCIAL

## 2021-04-09 ENCOUNTER — APPOINTMENT (OUTPATIENT)
Dept: CT IMAGING | Facility: CLINIC | Age: 61
End: 2021-04-09
Payer: COMMERCIAL

## 2021-04-09 DIAGNOSIS — F17.210 NICOTINE DEPENDENCE, CIGARETTES, UNCOMPLICATED: ICD-10-CM

## 2021-04-09 PROCEDURE — 71271 CT THORAX LUNG CANCER SCR C-: CPT

## 2021-04-09 PROCEDURE — 71271 CT THORAX LUNG CANCER SCR C-: CPT | Mod: 26

## 2021-05-10 DIAGNOSIS — Z01.818 ENCOUNTER FOR OTHER PREPROCEDURAL EXAMINATION: ICD-10-CM

## 2021-05-11 ENCOUNTER — APPOINTMENT (OUTPATIENT)
Dept: DISASTER EMERGENCY | Facility: CLINIC | Age: 61
End: 2021-05-11

## 2021-05-14 ENCOUNTER — APPOINTMENT (OUTPATIENT)
Dept: PULMONOLOGY | Facility: CLINIC | Age: 61
End: 2021-05-14

## 2021-06-23 ENCOUNTER — RX RENEWAL (OUTPATIENT)
Age: 61
End: 2021-06-23

## 2021-07-09 ENCOUNTER — RX CHANGE (OUTPATIENT)
Age: 61
End: 2021-07-09

## 2021-07-15 RX ORDER — ALBUTEROL SULFATE 90 UG/1
108 (90 BASE) POWDER, METERED RESPIRATORY (INHALATION) EVERY 4 HOURS
Qty: 1 | Refills: 5 | Status: COMPLETED | COMMUNITY
Start: 2021-04-05 | End: 2021-07-15

## 2021-07-16 RX ORDER — ALBUTEROL SULFATE 90 UG/1
108 (90 BASE) POWDER, METERED RESPIRATORY (INHALATION) EVERY 4 HOURS
Qty: 3 | Refills: 3 | Status: DISCONTINUED | COMMUNITY
Start: 2021-07-16 | End: 2021-07-16

## 2021-07-16 RX ORDER — ALBUTEROL SULFATE 90 UG/1
108 (90 BASE) INHALANT RESPIRATORY (INHALATION)
Qty: 3 | Refills: 3 | Status: DISCONTINUED | COMMUNITY
Start: 2021-03-09 | End: 2021-07-16

## 2021-07-20 ENCOUNTER — RX CHANGE (OUTPATIENT)
Age: 61
End: 2021-07-20

## 2021-07-20 RX ORDER — ALBUTEROL SULFATE 90 UG/1
108 (90 BASE) INHALANT RESPIRATORY (INHALATION)
Qty: 3 | Refills: 0 | Status: DISCONTINUED | COMMUNITY
Start: 2021-04-07 | End: 2021-07-20

## 2021-12-09 ENCOUNTER — RX RENEWAL (OUTPATIENT)
Age: 61
End: 2021-12-09

## 2022-01-17 ENCOUNTER — RX RENEWAL (OUTPATIENT)
Age: 62
End: 2022-01-17

## 2022-01-24 ENCOUNTER — RX RENEWAL (OUTPATIENT)
Age: 62
End: 2022-01-24

## 2022-07-22 ENCOUNTER — RX RENEWAL (OUTPATIENT)
Age: 62
End: 2022-07-22

## 2022-07-25 ENCOUNTER — APPOINTMENT (OUTPATIENT)
Dept: PULMONOLOGY | Facility: CLINIC | Age: 62
End: 2022-07-25

## 2022-12-11 ENCOUNTER — RX RENEWAL (OUTPATIENT)
Age: 62
End: 2022-12-11

## 2023-02-22 ENCOUNTER — RX RENEWAL (OUTPATIENT)
Age: 63
End: 2023-02-22

## 2023-03-20 NOTE — END OF VISIT
[de-identified] : COMES FOR CPE \par CC OF LEFT KNEE ARTHRALGIA AFTER A FALL OVER 3 M AGO ;ABLE TO PLAY SPORT BUT NEXT DAY FEELS PAIN .,NO LOCKING NO EFFUSION \par GOT  FEW MONTHS AGO  [>50% of Time Spent on Counseling and Coordination of Care for  ___] : Greater than 50% of the encounter time was spent on counseling and coordination of care for [unfilled] [Time Spent: ___ minutes] : I have spent [unfilled] minutes of face to face time with the patient

## 2023-04-04 ENCOUNTER — APPOINTMENT (OUTPATIENT)
Dept: ORTHOPEDIC SURGERY | Facility: CLINIC | Age: 63
End: 2023-04-04

## 2023-06-19 ENCOUNTER — RX CHANGE (OUTPATIENT)
Age: 63
End: 2023-06-19

## 2023-06-19 ENCOUNTER — APPOINTMENT (OUTPATIENT)
Dept: PULMONOLOGY | Facility: CLINIC | Age: 63
End: 2023-06-19
Payer: MEDICARE

## 2023-06-19 VITALS
OXYGEN SATURATION: 95 % | DIASTOLIC BLOOD PRESSURE: 64 MMHG | RESPIRATION RATE: 16 BRPM | BODY MASS INDEX: 31.34 KG/M2 | HEIGHT: 66 IN | SYSTOLIC BLOOD PRESSURE: 142 MMHG | HEART RATE: 79 BPM | WEIGHT: 195 LBS

## 2023-06-19 DIAGNOSIS — J44.9 CHRONIC OBSTRUCTIVE PULMONARY DISEASE, UNSPECIFIED: ICD-10-CM

## 2023-06-19 DIAGNOSIS — E66.9 OBESITY, UNSPECIFIED: ICD-10-CM

## 2023-06-19 DIAGNOSIS — R06.02 SHORTNESS OF BREATH: ICD-10-CM

## 2023-06-19 DIAGNOSIS — G47.33 OBSTRUCTIVE SLEEP APNEA (ADULT) (PEDIATRIC): ICD-10-CM

## 2023-06-19 DIAGNOSIS — Z87.891 PERSONAL HISTORY OF NICOTINE DEPENDENCE: ICD-10-CM

## 2023-06-19 PROCEDURE — 99215 OFFICE O/P EST HI 40 MIN: CPT | Mod: 25

## 2023-06-19 PROCEDURE — 99406 BEHAV CHNG SMOKING 3-10 MIN: CPT

## 2023-06-19 RX ORDER — ZOLPIDEM TARTRATE 10 MG/1
10 TABLET ORAL
Qty: 30 | Refills: 1 | Status: ACTIVE | COMMUNITY
Start: 2017-10-05 | End: 1900-01-01

## 2023-06-19 RX ORDER — TRAMADOL HYDROCHLORIDE 50 MG/1
50 TABLET, COATED ORAL
Qty: 30 | Refills: 0 | Status: DISCONTINUED | COMMUNITY
Start: 2020-06-02 | End: 2023-06-19

## 2023-06-19 RX ORDER — MELOXICAM 15 MG/1
15 TABLET ORAL
Qty: 30 | Refills: 3 | Status: DISCONTINUED | COMMUNITY
Start: 2020-06-02 | End: 2023-06-19

## 2023-06-19 RX ORDER — ALBUTEROL SULFATE 90 UG/1
108 (90 BASE) INHALANT RESPIRATORY (INHALATION) EVERY 6 HOURS
Qty: 3 | Refills: 3 | Status: DISCONTINUED | COMMUNITY
Start: 2023-06-19 | End: 2023-06-19

## 2023-06-19 RX ORDER — PREGABALIN 75 MG/1
75 CAPSULE ORAL
Qty: 28 | Refills: 1 | Status: DISCONTINUED | COMMUNITY
Start: 2020-06-02 | End: 2023-06-19

## 2023-06-19 RX ORDER — BUDESONIDE AND FORMOTEROL FUMARATE DIHYDRATE 160; 4.5 UG/1; UG/1
160-4.5 AEROSOL RESPIRATORY (INHALATION)
Qty: 3 | Refills: 3 | Status: DISCONTINUED | COMMUNITY
Start: 2021-01-12 | End: 2023-06-19

## 2023-06-19 NOTE — PHYSICAL EXAM
[Scattered Wheezes] : scattered wheezing [Low Lying Soft Palate] : low lying soft palate [Enlarged Base of the Tongue] : enlarged base of the tongue [II] : Mallampati Class: II [Normal Appearance] : normal appearance [Supple] : supple [Normal Rate/Rhythm] : normal rate/rhythm [Normal S1, S2] : normal s1, s2 [No Murmurs] : no murmurs [No Resp Distress] : no resp distress [No Acc Muscle Use] : no acc muscle use [Normal Rhythm and Effort] : normal rhythm and effort [Clear to Auscultation Bilaterally] : clear to auscultation bilaterally [No Abnormalities] : no abnormalities [Benign] : benign [Not Tender] : not tender [Soft] : soft [No Clubbing] : no clubbing [1+ Pitting] : 1+ pitting [No Focal Deficits] : no focal deficits [Oriented x3] : oriented x3 [TextBox_11] : Mild to mod oropharyngeal crowding.

## 2023-06-19 NOTE — REVIEW OF SYSTEMS
[Postnasal Drip] : postnasal drip [SOB on Exertion] : sob on exertion [Edema] : edema [Back Pain] : back pain [Depression] : depression [Anxiety] : anxiety [Obesity] : obesity [Negative] : Neurologic

## 2023-06-19 NOTE — HISTORY OF PRESENT ILLNESS
[Doing Well] : doing well [Difficulty Breathing During Exertion] : stable dyspnea on exertion [Feelings Of Weakness On Exertion] : stable exercise intolerance [Cough] : denies coughing [Coughing Up Sputum] : denies coughing up sputum [Wheezing] : denies wheezing [Regional Soft Tissue Swelling Both Lower Extremities] : denies lower extremity edema [Adherent] : the patient is adherent with ~his/her~ medication regimen [Goals--Doing Well] : the patient is doing well with ~his/her~ COPD goals [Excessive Daytime Sleepiness] : excessive daytime sleepiness [Snoring] : snoring [Unrefreshing Sleep] : unrefreshing sleep [Sleepy When Sedentary] : sleepy when sedentary [Morning Headaches] : morning headaches [Follow-Up - Routine Clinic] : a routine clinic follow-up of [Excess Weight] : excess weight [Currently Experiencing] : The patient is currently experiencing symptoms. [Dyspnea] : dyspnea [On ___] : performed on [unfilled] [Patient] : the patient [Indication ___] : for an indication of [unfilled] [None] : no new symptoms reported [TextBox_4] : The patient reports that she is recovering from a URI/bronchitis but continues to c/o mild cough and SOBOE. She reports completing a course of Augmentin for her bronchitis.\par She is an ex-smoker of 1 ppd x 43 (since age 17) years but is now down to 1/2 ppd on Wellbutrin.\par She was last seen > 2 years ago [Witnessed Apnea During Sleep] : no witnessed apnea during sleep [Witnessed Gasping During Sleep] : no witnessed gasping during sleep [FreeTextEntry9] : Chest CT [FreeTextEntry8] : Multiple very small nodules measuring up to 2 mm in size

## 2023-06-19 NOTE — DISCUSSION/SUMMARY
[FreeTextEntry1] : \par #1. She no longer has her BiPAP at 19/14 cm of water to treat severe YONY as she was noncompliant with it but was using an oral appliance which she also stopped; will repeat PSG as pt is willing to consider Inspire therapy if she is a candidate\par #2. Continue Spiriva and Symbicort for COPD with Albuterol nebulizer and Ventolin as needed; ordered nebulizer for Albuterol nebs as needed for COPD; pt with asthmatic component given BD response\par #3. Stressed smoking cessation; pt quit 7/2018 after 1 ppd x 43 yrs but now smoking 1/2 ppd with E-cigarette\par #4. Home PSG was attempted but pt had difficulty previously with it so will arrange for in-lab study\par #5. Diet and exercise for weight loss\par #6. Ambien for poor sleep as needed\par #7. Chest CT for lung cancer screening given smoking hx was stable so continue annual CT in 12/2023\par #7. F/u in 2 month with PFTs and PSG\par #8. Reviewed risks of exposure and symptoms of Covid-19 virus, including how the virus is spread and precautions to avoid li virus.\par \par The patient expressed understanding and agreement with the above recommendations/plan and accepts responsibility to be compliant with recommended testing, therapies, and f/u visits.\par All relevant questions and concerns were addressed.

## 2023-06-19 NOTE — CONSULT LETTER
[Dear  ___] : Dear  [unfilled], delivery set for 7/27 [Consult Letter:] : I had the pleasure of evaluating your patient, [unfilled]. [Please see my note below.] : Please see my note below. [Consult Closing:] : Thank you very much for allowing me to participate in the care of this patient.  If you have any questions, please do not hesitate to contact me. [Sincerely,] : Sincerely, [FreeTextEntry3] : Jostin Mendieta MD, FCCP, D. ABSM\par Pulmonary and Sleep Medicine\par [facility] at [place]

## 2023-06-20 ENCOUNTER — RX RENEWAL (OUTPATIENT)
Age: 63
End: 2023-06-20

## 2023-08-31 ENCOUNTER — APPOINTMENT (OUTPATIENT)
Dept: PULMONOLOGY | Facility: CLINIC | Age: 63
End: 2023-08-31

## 2023-09-21 ENCOUNTER — RX RENEWAL (OUTPATIENT)
Age: 63
End: 2023-09-21

## 2023-09-21 RX ORDER — FLUTICASONE PROPIONATE AND SALMETEROL XINAFOATE 115; 21 UG/1; UG/1
115-21 AEROSOL, METERED RESPIRATORY (INHALATION)
Qty: 3 | Refills: 2 | Status: ACTIVE | COMMUNITY
Start: 2023-06-19 | End: 1900-01-01

## 2023-09-26 ENCOUNTER — RX RENEWAL (OUTPATIENT)
Age: 63
End: 2023-09-26

## 2023-09-28 ENCOUNTER — APPOINTMENT (OUTPATIENT)
Dept: PULMONOLOGY | Facility: CLINIC | Age: 63
End: 2023-09-28

## 2023-12-05 ENCOUNTER — NON-APPOINTMENT (OUTPATIENT)
Age: 63
End: 2023-12-05

## 2023-12-05 ENCOUNTER — APPOINTMENT (OUTPATIENT)
Dept: OPHTHALMOLOGY | Facility: CLINIC | Age: 63
End: 2023-12-05
Payer: COMMERCIAL

## 2023-12-05 PROCEDURE — 92004 COMPRE OPH EXAM NEW PT 1/>: CPT

## 2024-01-26 ENCOUNTER — APPOINTMENT (OUTPATIENT)
Dept: OPHTHALMOLOGY | Facility: CLINIC | Age: 64
End: 2024-01-26
Payer: COMMERCIAL

## 2024-01-26 ENCOUNTER — NON-APPOINTMENT (OUTPATIENT)
Age: 64
End: 2024-01-26

## 2024-01-26 PROCEDURE — 92136 OPHTHALMIC BIOMETRY: CPT

## 2024-01-26 PROCEDURE — 92014 COMPRE OPH EXAM EST PT 1/>: CPT

## 2024-02-07 ENCOUNTER — NON-APPOINTMENT (OUTPATIENT)
Age: 64
End: 2024-02-07

## 2024-02-07 ENCOUNTER — APPOINTMENT (OUTPATIENT)
Dept: OPHTHALMOLOGY | Facility: HOSPITAL | Age: 64
End: 2024-02-07
Payer: COMMERCIAL

## 2024-02-07 PROCEDURE — 66984 XCAPSL CTRC RMVL W/O ECP: CPT | Mod: LT

## 2024-02-08 ENCOUNTER — NON-APPOINTMENT (OUTPATIENT)
Age: 64
End: 2024-02-08

## 2024-02-08 ENCOUNTER — APPOINTMENT (OUTPATIENT)
Dept: OPHTHALMOLOGY | Facility: CLINIC | Age: 64
End: 2024-02-08
Payer: COMMERCIAL

## 2024-02-08 PROCEDURE — 99024 POSTOP FOLLOW-UP VISIT: CPT

## 2024-02-09 PROBLEM — Z00.00 ENCOUNTER FOR PREVENTIVE HEALTH EXAMINATION: Status: ACTIVE | Noted: 2024-02-09

## 2024-02-15 ENCOUNTER — NON-APPOINTMENT (OUTPATIENT)
Age: 64
End: 2024-02-15

## 2024-02-15 ENCOUNTER — APPOINTMENT (OUTPATIENT)
Dept: OPHTHALMOLOGY | Facility: CLINIC | Age: 64
End: 2024-02-15
Payer: COMMERCIAL

## 2024-02-15 PROCEDURE — 99024 POSTOP FOLLOW-UP VISIT: CPT

## 2024-02-20 ENCOUNTER — APPOINTMENT (OUTPATIENT)
Dept: NEUROSURGERY | Facility: CLINIC | Age: 64
End: 2024-02-20

## 2024-03-04 ENCOUNTER — APPOINTMENT (OUTPATIENT)
Dept: ORTHOPEDIC SURGERY | Facility: CLINIC | Age: 64
End: 2024-03-04
Payer: COMMERCIAL

## 2024-03-04 VITALS — BODY MASS INDEX: 28.93 KG/M2 | HEIGHT: 66 IN | WEIGHT: 180 LBS

## 2024-03-04 DIAGNOSIS — M48.062 SPINAL STENOSIS, LUMBAR REGION WITH NEUROGENIC CLAUDICATION: ICD-10-CM

## 2024-03-04 DIAGNOSIS — M47.816 SPONDYLOSIS W/OUT MYELOPATHY OR RADICULOPATHY, LUMBAR REGION: ICD-10-CM

## 2024-03-04 PROCEDURE — ZZZZZ: CPT

## 2024-03-04 NOTE — DISCUSSION/SUMMARY
[de-identified] : 64 y/o F with significant spinal stenosis L3-S1, sev foraminal stenosis L5S1.  Considering the patient's functionally debilitating pain despite extensive pain management injections, she is deemed a surgical candidate for a three-level laminectomy and fusion. Anticipate requirement for aggressive bilateral facet resection at all levels to achieve decompression and recommend instrumented arthrodesis to avoid iatrogenic instability. . She will contemplate this option. However, no definitive surgical plans have been made today.   Prior to appointment and during encounter with patient extensive medical records were reviewed including but not limited to, hospital records, outpatient records, imaging results, and lab data.During this appointment the patient was examined, diagnoses were discussed and explained in a face to face manner. In addition extensive time was spent reviewing aforementioned diagnostic studies. Counseling including abnormal image results, differential diagnoses, treatment options, risk and benefits, lifestyle changes, current condition, and current medications was performed. Patient's comments, questions, and concerns were addressed and patient verbalized understanding. Based on this patient's presentation at our office, which is an orthopedic spine surgeon's office, this patient inherently / intrinsically has a risk, however minute, of developing issues such as Cauda equina syndrome, bowel and bladder changes, or progression of motor or neurological deficits such as paralysis which may be permanent.  KIKI MENDES Acting as a Scribe for Dr. Jossue SALINAS, Kiki Mendes, attest that this documentation has been prepared under the direction and in the presence of Provider Elton Marcum MD.

## 2024-03-04 NOTE — PHYSICAL EXAM
[Normal Coordination] : normal coordination [Normal DTR UE/LE] : normal DTR UE/LE  [Normal Sensation] : normal sensation [Normal Mood and Affect] : normal mood and affect [Oriented] : oriented [Able to Communicate] : able to communicate [Normal Skin] : normal skin [No Rash] : no rash [No Ulcers] : no ulcers [No obvious lymphadenopathy in areas examined] : no obvious lymphadenopathy in areas examined [No Lesions] : no lesions [Well Developed] : well developed [Peripheral vascular exam is grossly normal] : peripheral vascular exam is grossly normal [No Respiratory Distress] : no respiratory distress [de-identified] : Constitutional: - General Appearance: Unremarkable Body Habitus Well Developed Well Nourished Body Habitus No Deformities Well Groomed Ability To communicate: Normal Neurologic: Global sensation is intact to upper and lower extremities. See examination of Neck and/or Spine for exceptions. Orientation to Time, Place and Person is: Normal Mood And Affect is Normal Skin: - Head/Face, Right Upper/Lower Extremity, Left Upper/Lower Extremity: Normal See Examination of Neck and/or Spine for exceptions Cardiovascular: Peripheral Cardiovascular System is Normal Palpation of Lymph Nodes: Normal Palpation of lymph nodes in: Axilla, Cervical, Inguinal Abnormal Palpation of lymph nodes in: None  [] : negative sitting straight leg raise

## 2024-03-04 NOTE — HISTORY OF PRESENT ILLNESS
[Lower back] : lower back [Gradual] : gradual [8] : 8 [Burning] : burning [Radiating] : radiating [Constant] : constant [Sleep] : sleep [Nothing helps with pain getting better] : Nothing helps with pain getting better [Walking] : walking [Lying in bed] : lying in bed [Physical therapy] : physical therapy [] : no [de-identified] : 03/04/2024 - 64 y/o F is seeking their first evaluation for chronic lower back pain that extends down the left leg. She has undergone extensive pain management treatments, including epidural steroid injections and ablations, which have provided relief for only brief periods, at most a few days. Her mobility is severely restricted, and she relies on a cane for walking. She describes the pain as excruciating but mentions that her overall medical health is satisfactory.       [FreeTextEntry7] : left leg [FreeTextEntry5] : low back pain for awhile, no recent injury. Has gone to pain mangement and MRI was ordered [de-identified] : pain managemnt  [de-identified] : MRI Stand up  [de-identified] : not working

## 2024-04-02 ENCOUNTER — APPOINTMENT (OUTPATIENT)
Dept: OPHTHALMOLOGY | Facility: CLINIC | Age: 64
End: 2024-04-02

## 2024-04-17 ENCOUNTER — APPOINTMENT (OUTPATIENT)
Dept: OPHTHALMOLOGY | Facility: HOSPITAL | Age: 64
End: 2024-04-17

## 2024-04-18 ENCOUNTER — APPOINTMENT (OUTPATIENT)
Dept: OPHTHALMOLOGY | Facility: CLINIC | Age: 64
End: 2024-04-18

## 2024-04-23 ENCOUNTER — APPOINTMENT (OUTPATIENT)
Dept: OPHTHALMOLOGY | Facility: CLINIC | Age: 64
End: 2024-04-23

## 2024-05-08 RX ORDER — LEVALBUTEROL TARTRATE 45 UG/1
45 AEROSOL, METERED ORAL
Qty: 3 | Refills: 0 | Status: ACTIVE | COMMUNITY
Start: 2021-07-20 | End: 1900-01-01

## 2024-06-10 ENCOUNTER — RX RENEWAL (OUTPATIENT)
Age: 64
End: 2024-06-10

## 2024-06-12 RX ORDER — ALBUTEROL SULFATE 90 UG/1
108 (90 BASE) POWDER, METERED RESPIRATORY (INHALATION) EVERY 4 HOURS
Qty: 1 | Refills: 1 | Status: ACTIVE | COMMUNITY
Start: 2023-06-19 | End: 1900-01-01

## 2024-06-12 RX ORDER — ALBUTEROL SULFATE 90 UG/1
108 (90 BASE) INHALANT RESPIRATORY (INHALATION)
Qty: 1 | Refills: 1 | Status: ACTIVE | COMMUNITY
Start: 2021-07-16 | End: 1900-01-01

## 2024-06-13 RX ORDER — FLUTICASONE PROPIONATE AND SALMETEROL 250; 50 UG/1; UG/1
250-50 POWDER RESPIRATORY (INHALATION)
Qty: 3 | Refills: 0 | Status: ACTIVE | COMMUNITY
Start: 2024-03-18 | End: 1900-01-01

## 2024-06-28 ENCOUNTER — RX RENEWAL (OUTPATIENT)
Age: 64
End: 2024-06-28

## 2024-07-17 ENCOUNTER — RX RENEWAL (OUTPATIENT)
Age: 64
End: 2024-07-17

## 2024-07-31 ENCOUNTER — APPOINTMENT (OUTPATIENT)
Dept: PULMONOLOGY | Facility: CLINIC | Age: 64
End: 2024-07-31
Payer: COMMERCIAL

## 2024-07-31 VITALS
WEIGHT: 160 LBS | RESPIRATION RATE: 16 BRPM | DIASTOLIC BLOOD PRESSURE: 81 MMHG | BODY MASS INDEX: 25.71 KG/M2 | HEART RATE: 71 BPM | HEIGHT: 66 IN | OXYGEN SATURATION: 92 % | SYSTOLIC BLOOD PRESSURE: 126 MMHG

## 2024-07-31 DIAGNOSIS — J44.89 OTHER SPECIFIED CHRONIC OBSTRUCTIVE PULMONARY DISEASE: ICD-10-CM

## 2024-07-31 DIAGNOSIS — G47.33 OBSTRUCTIVE SLEEP APNEA (ADULT) (PEDIATRIC): ICD-10-CM

## 2024-07-31 DIAGNOSIS — Z87.891 PERSONAL HISTORY OF NICOTINE DEPENDENCE: ICD-10-CM

## 2024-07-31 DIAGNOSIS — J44.9 CHRONIC OBSTRUCTIVE PULMONARY DISEASE, UNSPECIFIED: ICD-10-CM

## 2024-07-31 DIAGNOSIS — R06.02 SHORTNESS OF BREATH: ICD-10-CM

## 2024-07-31 DIAGNOSIS — E66.9 OBESITY, UNSPECIFIED: ICD-10-CM

## 2024-07-31 DIAGNOSIS — E66.3 OVERWEIGHT: ICD-10-CM

## 2024-07-31 PROCEDURE — 99215 OFFICE O/P EST HI 40 MIN: CPT

## 2024-07-31 RX ORDER — TRAMADOL HYDROCHLORIDE 25 MG/1
TABLET, COATED ORAL
Refills: 0 | Status: ACTIVE | COMMUNITY

## 2024-07-31 RX ORDER — FLUTICASONE FUROATE, UMECLIDINIUM BROMIDE AND VILANTEROL TRIFENATATE 200; 62.5; 25 UG/1; UG/1; UG/1
200-62.5-25 POWDER RESPIRATORY (INHALATION) DAILY
Qty: 1 | Refills: 5 | Status: ACTIVE | COMMUNITY
Start: 2024-07-31 | End: 1900-01-01

## 2024-07-31 NOTE — DISCUSSION/SUMMARY
[FreeTextEntry1] : #1. She no longer has her BiPAP at 19/14 cm of water to treat severe YONY as she was noncompliant with it but was using an oral appliance which she also stopped. She has lost >30lbs and wants to repeat her HST to re-evaluate her YONY. #2. Was on Spiriva and Symbicort for COPD with Albuterol nebulizer and Ventolin as needed but now will start Trelegy 200 and evaluate response. Will also order nebulizer for Albuterol nebs as needed for COPD; pt with asthmatic component given BD response. #3. Stressed smoking cessation; pt quit 4/2024 after 1 ppd x 43 yrs but now with E-cigarettes. #4. Home PSG was attempted but pt had difficulty previously so will repeat per pt's request. #5. Diet and exercise for weight loss #6. Ambien for poor sleep as needed #7. Chest CT for lung cancer screening given smoking hx was stable so continue annual CT in 12/2024 #8. F/u in 3 months with PFTs and HST.  The patient expressed understanding and agreement with the above recommendations/plan and accepts responsibility to be compliant with recommended testing, therapies, and f/u visits. All relevant questions and concerns were addressed.

## 2024-07-31 NOTE — CONSULT LETTER
[Dear  ___] : Dear  [unfilled], [Consult Letter:] : I had the pleasure of evaluating your patient, [unfilled]. [Please see my note below.] : Please see my note below. [Consult Closing:] : Thank you very much for allowing me to participate in the care of this patient.  If you have any questions, please do not hesitate to contact me. [Sincerely,] : Sincerely, [FreeTextEntry3] : Jostin Mendieta MD, FCCP, D. ABSM\par  Pulmonary and Sleep Medicine\par  [facility] at [place]

## 2024-07-31 NOTE — HISTORY OF PRESENT ILLNESS
[Doing Well] : doing well [Difficulty Breathing During Exertion] : stable dyspnea on exertion [Feelings Of Weakness On Exertion] : stable exercise intolerance [Cough] : denies coughing [Coughing Up Sputum] : denies coughing up sputum [Wheezing] : denies wheezing [Regional Soft Tissue Swelling Both Lower Extremities] : denies lower extremity edema [Adherent] : the patient is adherent with ~his/her~ medication regimen [Goals--Doing Well] : the patient is doing well with ~his/her~ COPD goals [Excessive Daytime Sleepiness] : excessive daytime sleepiness [Snoring] : snoring [Unrefreshing Sleep] : unrefreshing sleep [Sleepy When Sedentary] : sleepy when sedentary [Morning Headaches] : morning headaches [Follow-Up - Routine Clinic] : a routine clinic follow-up of [Excess Weight] : excess weight [Currently Experiencing] : The patient is currently experiencing symptoms. [Dyspnea] : dyspnea [On ___] : performed on [unfilled] [Patient] : the patient [Indication ___] : for an indication of [unfilled] [None] : no new symptoms reported [TextBox_4] : The patient reports that she is recovering from a URI/bronchitis but continues to c/o mild cough and SOBOE. She reports completing a course of Augmentin for her bronchitis. She is an ex-smoker of 1 ppd x 43 (since age 17) years but is now down to 1/2 ppd on Wellbutrin. She was last seen > 1 year ago and prior to that, >2 years ago. [Witnessed Apnea During Sleep] : no witnessed apnea during sleep [Witnessed Gasping During Sleep] : no witnessed gasping during sleep [FreeTextEntry9] : Chest CT [FreeTextEntry8] : Multiple very small nodules measuring up to 2 mm in size [TextEntry] : Chest CT from 11/17/16 revealed improvement in previously seen nodules otherwise had stable changes. Chest CT from 11/30/17 revealed stable changes. Chest CT from 12/14/18 revealed stable change with unchanged 3 mm DOMINIC nodule. Chest CT from 12/30/19 revealed stable changes. Chest CT from 12/2/22 emphysematous changes with atelectasis and persistent bronchial wall thickening. Chest CT from 12/9/23 with stable changes - reviewed results and films with patient.

## 2024-07-31 NOTE — PHYSICAL EXAM
[Low Lying Soft Palate] : low lying soft palate [Enlarged Base of the Tongue] : enlarged base of the tongue [II] : Mallampati Class: II [Normal Appearance] : normal appearance [Supple] : supple [Normal Rate/Rhythm] : normal rate/rhythm [Normal S1, S2] : normal s1, s2 [No Murmurs] : no murmurs [No Resp Distress] : no resp distress [No Acc Muscle Use] : no acc muscle use [Normal Rhythm and Effort] : normal rhythm and effort [Clear to Auscultation Bilaterally] : clear to auscultation bilaterally [No Abnormalities] : no abnormalities [Benign] : benign [Not Tender] : not tender [Soft] : soft [No Clubbing] : no clubbing [1+ Pitting] : 1+ pitting [No Focal Deficits] : no focal deficits [Oriented x3] : oriented x3 [TextBox_11] : Mild to mod oropharyngeal crowding.

## 2024-07-31 NOTE — END OF VISIT
[Time Spent: ___ minutes] : I have spent [unfilled] minutes of time on the encounter. [TextEntry] : Discussed with pt at length regarding COPD, nicotine dependence, YONY, weight issues, cough, wheeze, bronchitis, lung cancer screening; reviewed prior w/u with pt.

## 2024-07-31 NOTE — RESULTS/DATA
[TextEntry] : Chest CT imaging as above.    PSG revealed severe YONY with an AHI of 34 and RDI of 45. BiPAP titration study revealed an optimal pressure of 19/14 cm of water. PSG from 7/9/15 revealed severe YONY with an AHI of 50 and RDI of 74

## 2024-09-04 ENCOUNTER — RX RENEWAL (OUTPATIENT)
Age: 64
End: 2024-09-04

## 2024-10-28 ENCOUNTER — RX RENEWAL (OUTPATIENT)
Age: 64
End: 2024-10-28

## 2024-11-01 ENCOUNTER — RX RENEWAL (OUTPATIENT)
Age: 64
End: 2024-11-01

## 2025-02-25 ENCOUNTER — APPOINTMENT (OUTPATIENT)
Dept: PULMONOLOGY | Facility: CLINIC | Age: 65
End: 2025-02-25
Payer: COMMERCIAL

## 2025-02-25 VITALS
RESPIRATION RATE: 16 BRPM | WEIGHT: 142 LBS | OXYGEN SATURATION: 94 % | HEART RATE: 72 BPM | DIASTOLIC BLOOD PRESSURE: 77 MMHG | HEIGHT: 65 IN | BODY MASS INDEX: 23.66 KG/M2 | SYSTOLIC BLOOD PRESSURE: 124 MMHG

## 2025-02-25 VITALS — BODY MASS INDEX: 23.66 KG/M2 | HEIGHT: 65 IN | WEIGHT: 142 LBS

## 2025-02-25 DIAGNOSIS — E66.3 OVERWEIGHT: ICD-10-CM

## 2025-02-25 DIAGNOSIS — R06.02 SHORTNESS OF BREATH: ICD-10-CM

## 2025-02-25 DIAGNOSIS — F17.200 NICOTINE DEPENDENCE, UNSPECIFIED, UNCOMPLICATED: ICD-10-CM

## 2025-02-25 DIAGNOSIS — J44.89 OTHER SPECIFIED CHRONIC OBSTRUCTIVE PULMONARY DISEASE: ICD-10-CM

## 2025-02-25 DIAGNOSIS — Z87.891 PERSONAL HISTORY OF NICOTINE DEPENDENCE: ICD-10-CM

## 2025-02-25 DIAGNOSIS — J44.9 CHRONIC OBSTRUCTIVE PULMONARY DISEASE, UNSPECIFIED: ICD-10-CM

## 2025-02-25 DIAGNOSIS — G47.33 OBSTRUCTIVE SLEEP APNEA (ADULT) (PEDIATRIC): ICD-10-CM

## 2025-02-25 PROCEDURE — 94727 GAS DIL/WSHOT DETER LNG VOL: CPT

## 2025-02-25 PROCEDURE — 94010 BREATHING CAPACITY TEST: CPT

## 2025-02-25 PROCEDURE — 85018 HEMOGLOBIN: CPT | Mod: QW

## 2025-02-25 PROCEDURE — 99215 OFFICE O/P EST HI 40 MIN: CPT | Mod: 25

## 2025-02-25 PROCEDURE — 94729 DIFFUSING CAPACITY: CPT

## 2025-02-25 RX ORDER — BUDESONIDE, GLYCOPYRROLATE, AND FORMOTEROL FUMARATE 160; 9; 4.8 UG/1; UG/1; UG/1
160-9-4.8 AEROSOL, METERED RESPIRATORY (INHALATION) TWICE DAILY
Qty: 3 | Refills: 3 | Status: ACTIVE | COMMUNITY
Start: 2025-02-25 | End: 1900-01-01

## 2025-02-27 ENCOUNTER — RX RENEWAL (OUTPATIENT)
Age: 65
End: 2025-02-27

## 2025-07-16 ENCOUNTER — RX RENEWAL (OUTPATIENT)
Age: 65
End: 2025-07-16

## 2025-07-23 ENCOUNTER — RX RENEWAL (OUTPATIENT)
Age: 65
End: 2025-07-23

## 2025-08-09 ENCOUNTER — RX RENEWAL (OUTPATIENT)
Age: 65
End: 2025-08-09